# Patient Record
Sex: FEMALE | Race: WHITE | NOT HISPANIC OR LATINO | Employment: OTHER | URBAN - METROPOLITAN AREA
[De-identification: names, ages, dates, MRNs, and addresses within clinical notes are randomized per-mention and may not be internally consistent; named-entity substitution may affect disease eponyms.]

---

## 2017-05-24 ENCOUNTER — OFFICE VISIT (OUTPATIENT)
Dept: PULMONOLOGY | Facility: CLINIC | Age: 57
End: 2017-05-24
Payer: COMMERCIAL

## 2017-05-24 ENCOUNTER — HOSPITAL ENCOUNTER (OUTPATIENT)
Dept: RADIOLOGY | Facility: HOSPITAL | Age: 57
Discharge: HOME | End: 2017-05-24
Attending: NURSE PRACTITIONER
Payer: COMMERCIAL

## 2017-05-24 ENCOUNTER — TELEPHONE (OUTPATIENT)
Dept: PULMONOLOGY | Facility: CLINIC | Age: 57
End: 2017-05-24

## 2017-05-24 VITALS
RESPIRATION RATE: 20 BRPM | SYSTOLIC BLOOD PRESSURE: 100 MMHG | BODY MASS INDEX: 28.11 KG/M2 | WEIGHT: 152.75 LBS | HEIGHT: 62 IN | DIASTOLIC BLOOD PRESSURE: 64 MMHG | HEART RATE: 90 BPM | OXYGEN SATURATION: 97 %

## 2017-05-24 DIAGNOSIS — J45.20 MILD INTERMITTENT ASTHMA WITHOUT COMPLICATION: ICD-10-CM

## 2017-05-24 DIAGNOSIS — J45.20 MILD INTERMITTENT ASTHMA WITHOUT COMPLICATION: Primary | ICD-10-CM

## 2017-05-24 DIAGNOSIS — J45.51 SEVERE PERSISTENT ASTHMA WITH ACUTE EXACERBATION: Primary | ICD-10-CM

## 2017-05-24 PROCEDURE — 71020 XR CHEST PA AND LATERAL: CPT | Mod: TC,PO

## 2017-05-24 PROCEDURE — 99214 OFFICE O/P EST MOD 30 MIN: CPT | Mod: S$GLB,,, | Performed by: NURSE PRACTITIONER

## 2017-05-24 PROCEDURE — 99999 PR PBB SHADOW E&M-EST. PATIENT-LVL III: CPT | Mod: PBBFAC,,, | Performed by: NURSE PRACTITIONER

## 2017-05-24 PROCEDURE — 71020 XR CHEST PA AND LATERAL: CPT | Mod: 26,,, | Performed by: RADIOLOGY

## 2017-05-24 PROCEDURE — 1160F RVW MEDS BY RX/DR IN RCRD: CPT | Mod: S$GLB,,, | Performed by: NURSE PRACTITIONER

## 2017-05-24 RX ORDER — ESTRADIOL 0.03 MG/D
1 FILM, EXTENDED RELEASE TRANSDERMAL
COMMUNITY
End: 2021-12-06

## 2017-05-24 RX ORDER — ALBUTEROL SULFATE 90 UG/1
2 AEROSOL, METERED RESPIRATORY (INHALATION) EVERY 4 HOURS PRN
Qty: 18 G | Refills: 11 | Status: SHIPPED | OUTPATIENT
Start: 2017-05-24 | End: 2018-10-09 | Stop reason: SDUPTHER

## 2017-05-24 RX ORDER — LEVOTHYROXINE SODIUM 150 UG/1
150 TABLET ORAL
COMMUNITY
Start: 2017-01-24 | End: 2022-10-28

## 2017-05-24 RX ORDER — AZITHROMYCIN 250 MG/1
TABLET, FILM COATED ORAL
Qty: 6 TABLET | Refills: 0 | Status: SHIPPED | OUTPATIENT
Start: 2017-05-24 | End: 2021-12-23

## 2017-05-24 RX ORDER — PREDNISONE 20 MG/1
TABLET ORAL
Qty: 20 TABLET | Refills: 1 | Status: SHIPPED | OUTPATIENT
Start: 2017-05-24 | End: 2018-10-09 | Stop reason: SDUPTHER

## 2017-05-24 RX ORDER — BUDESONIDE AND FORMOTEROL FUMARATE DIHYDRATE 160; 4.5 UG/1; UG/1
2 AEROSOL RESPIRATORY (INHALATION) EVERY 12 HOURS
Qty: 1 INHALER | Refills: 12 | Status: SHIPPED | OUTPATIENT
Start: 2017-05-24 | End: 2018-10-09 | Stop reason: SDUPTHER

## 2017-05-24 NOTE — PROGRESS NOTES
Subjective:        Lisa Shore is a 57 y.o. female who has previously been evaluated here for asthma and presents for evaluation with complaints of acute asthma flare with increased cough. The patient is currently having symptoms / an exacerbation. Current symptoms include chest tightness, dyspnea, productive cough and wheezing, cough is occasionally productive of clear mucous. Symptoms have been present since 1 week ago and have been gradually worsening. She denies chest pain. Associated symptoms include fatigue, myalgias, poor exercise tolerance, shortness of breath twice in past week and wheezing.  This episode appears to have been triggered by dust, pollens, strong odors and upper respiratory infection. Treatments tried for the current exacerbation include had two days dose of symbicort then out of meds., which have provided some relief of symptoms. The patient has been having similar episodes for approximately 1 -2 times a year.     Current Disease Severity  The patient is having daytime symptoms throughout the day. The patient is having daytime symptoms more than once per week, but not nightly. The patient is using short-acting beta agonists for symptom control out of albuterol inhaler and symbicort. She has exacerbations requiring oral systemic corticosteroids 1 times per year. Current limitations in activity from asthma: some limitation today, prior to today, none. .  Number of urgent/emergent visit in last year: 0, none up until today.  The patient is not using a spacer with MDIs. Her best peak flow rate is not monitoring.  She is not monitoring peak flow rates at home.    The following portions of the patient's history were reviewed and updated as appropriate: allergies, current medications, past family history, past medical history, past social history, past surgical history and problem list.    Review of Systems  A comprehensive review of systems was negative except for: Constitutional:  "positive for fatigue and malaise  Ears, nose, mouth, throat, and face: positive for hoarseness, sore throat, voice change and post nasal drip  Respiratory: positive for cough and wheezing      Objective:      /64 (BP Location: Right arm, Patient Position: Sitting, BP Method: Manual)   Pulse 90   Resp 20   Ht 5' 2.4" (1.585 m)   Wt 69.3 kg (152 lb 12.5 oz)   SpO2 97%   BMI 27.59 kg/m²   General appearance: alert, appears stated age and cooperative  Head: Normocephalic, without obvious abnormality, atraumatic  Eyes: conjunctiva clear  Ears: normal TM's and external ear canals both ears  Nose: Nares normal. Septum midline. Mucosa normal. No drainage or sinus tenderness.  Throat: lips, mucosa, and tongue normal; teeth and gums normal  Lungs: clear to auscultation bilaterally  Heart: regular rate and rhythm, S1, S2 normal, no murmur, click, rub or gallop  Abdomen: soft, non-tender; bowel sounds normal; no masses,  no organomegaly  Extremities: extremities normal, atraumatic, no cyanosis or edema  Pulses: 2+ and symmetric  Skin: Skin color, texture, turgor normal. No rashes or lesions  Lymph nodes: Cervical, supraclavicular, and axillary nodes normal.    Diagnostic Review  Chest x-ray:   5/24/2017  The cardiac and mediastinal silhouettes are within normal limits.   The lungs are clear bilaterally.   Visualized osseous structures demonstrate no acute abnormality.     Assessment:      Severe persistent asthma. The patient is currently having an exacerbation. In general, the patient's disease is well controlled with mild intermittent asthma.        Plan:   Lisa Gage was seen today for cough and shortness of breath.    Diagnoses and all orders for this visit:    Severe persistent asthma with acute exacerbation  -     predniSONE (DELTASONE) 20 MG tablet; Prednisone 60 mg/ day for 3 days, 40 mg/day for 3 days,20 mg/ day for 3 days, (1/2 tablet )10 mg a day for 3 days.  -     azithromycin (Z-KOFFI) 250 MG tablet; " As per packet instructions  -     budesonide-formoterol 160-4.5 mcg (SYMBICORT) 160-4.5 mcg/actuation HFAA; Inhale 2 puffs into the lungs every 12 (twelve) hours. Wash out mouth after using  -     albuterol (VENTOLIN HFA) 90 mcg/actuation inhaler; Inhale 2 puffs into the lungs every 4 (four) hours as needed for Wheezing.  -     inhalation spacing device (BREATHERITE VALVED MDI CHAMBER); Use as directed for inhalation.  -     Spirometry with/without bronchodilator; Future       Review treatment goals of symptom prevention, minimizing limitation in activity, prevention of exacerbations and use of ER/inpatient care, maintenance of optimal pulmonary function and minimization of adverse effects of treatment.  Discussed distinction between quick-relief and controlled medications.  Discussed medication dosage, use, side effects, and goals of treatment in detail.    Warning signs of respiratory distress were reviewed with the patient.   Discussed avoidance of precipitants..      Return in about 4 weeks (around 6/21/2017) for asthma follow up, w/obed Ventura/louann pretty.

## 2017-06-14 ENCOUNTER — TELEPHONE (OUTPATIENT)
Dept: PULMONOLOGY | Facility: CLINIC | Age: 57
End: 2017-06-14

## 2018-10-08 ENCOUNTER — TELEPHONE (OUTPATIENT)
Dept: PULMONOLOGY | Facility: CLINIC | Age: 58
End: 2018-10-08

## 2018-10-08 NOTE — TELEPHONE ENCOUNTER
----- Message from Dominga Downing sent at 10/8/2018 11:46 AM CDT -----  Contact: pt  The pt request a return call, no additional info given, the pt can be reached at [phone]///thxMW

## 2018-10-09 ENCOUNTER — OFFICE VISIT (OUTPATIENT)
Dept: PULMONOLOGY | Facility: CLINIC | Age: 58
End: 2018-10-09
Payer: COMMERCIAL

## 2018-10-09 ENCOUNTER — HOSPITAL ENCOUNTER (OUTPATIENT)
Dept: RADIOLOGY | Facility: HOSPITAL | Age: 58
Discharge: HOME | End: 2018-10-09
Attending: NURSE PRACTITIONER
Payer: COMMERCIAL

## 2018-10-09 VITALS
RESPIRATION RATE: 17 BRPM | HEIGHT: 62 IN | DIASTOLIC BLOOD PRESSURE: 78 MMHG | OXYGEN SATURATION: 98 % | WEIGHT: 155.19 LBS | BODY MASS INDEX: 28.56 KG/M2 | SYSTOLIC BLOOD PRESSURE: 120 MMHG | HEART RATE: 72 BPM

## 2018-10-09 DIAGNOSIS — J45.20 MILD INTERMITTENT ASTHMA WITHOUT COMPLICATION: ICD-10-CM

## 2018-10-09 DIAGNOSIS — J45.21 MILD INTERMITTENT ASTHMA WITH (ACUTE) EXACERBATION: Primary | ICD-10-CM

## 2018-10-09 DIAGNOSIS — J32.0 RIGHT MAXILLARY SINUSITIS: ICD-10-CM

## 2018-10-09 DIAGNOSIS — J45.21 MILD INTERMITTENT ASTHMA WITH (ACUTE) EXACERBATION: ICD-10-CM

## 2018-10-09 PROCEDURE — 3008F BODY MASS INDEX DOCD: CPT | Mod: CPTII,S$GLB,, | Performed by: NURSE PRACTITIONER

## 2018-10-09 PROCEDURE — 71046 X-RAY EXAM CHEST 2 VIEWS: CPT | Mod: TC,FY,PO

## 2018-10-09 PROCEDURE — 71046 X-RAY EXAM CHEST 2 VIEWS: CPT | Mod: 26,,, | Performed by: RADIOLOGY

## 2018-10-09 PROCEDURE — 99214 OFFICE O/P EST MOD 30 MIN: CPT | Mod: S$GLB,,, | Performed by: NURSE PRACTITIONER

## 2018-10-09 PROCEDURE — 99999 PR PBB SHADOW E&M-EST. PATIENT-LVL IV: CPT | Mod: PBBFAC,,, | Performed by: NURSE PRACTITIONER

## 2018-10-09 RX ORDER — AMOXICILLIN AND CLAVULANATE POTASSIUM 875; 125 MG/1; MG/1
1 TABLET, FILM COATED ORAL EVERY 12 HOURS
Qty: 20 TABLET | Refills: 0 | Status: SHIPPED | OUTPATIENT
Start: 2018-10-09 | End: 2021-12-23 | Stop reason: SDUPTHER

## 2018-10-09 RX ORDER — PREDNISONE 20 MG/1
TABLET ORAL
Qty: 20 TABLET | Refills: 1 | Status: SHIPPED | OUTPATIENT
Start: 2018-10-09 | End: 2021-12-23

## 2018-10-09 RX ORDER — ALBUTEROL SULFATE 90 UG/1
2 AEROSOL, METERED RESPIRATORY (INHALATION) EVERY 4 HOURS PRN
Qty: 18 G | Refills: 11 | Status: SHIPPED | OUTPATIENT
Start: 2018-10-09 | End: 2019-10-09

## 2018-10-09 RX ORDER — BUDESONIDE AND FORMOTEROL FUMARATE DIHYDRATE 160; 4.5 UG/1; UG/1
2 AEROSOL RESPIRATORY (INHALATION) EVERY 12 HOURS
Qty: 1 INHALER | Refills: 12 | Status: SHIPPED | OUTPATIENT
Start: 2018-10-09 | End: 2021-12-23 | Stop reason: SDUPTHER

## 2018-10-09 NOTE — PROGRESS NOTES
Subjective:      Chief Complaint   Patient presents with    Asthma     acute flare       Lisa Shore is a 58 y.o. female who is followed in pulmonary by Dr. Guerrero for asthma, presents acute asthma flare with wheezing and cough.   The patient is currently having symptoms / exacerbation.   Current symptoms include chest tightness, dyspnea, productive cough and wheezing, cough is occasionally productive of clear mucous.   Symptoms have been present since 1 week ago and have been gradually worsening. She denies chest pain.   Associated symptoms include fatigue, myalgias, poor exercise tolerance, shortness of breath and wheezing.    This episode appears to have been triggered by pollens and upper respiratory infection.   Treatments tried for the current exacerbation include zyrtec and flonase.  Out of Symbicort and albuterol inhalers since March 2018. , which have provided some relief of symptoms. The patient has been having similar episodes for approximately 1 -2 times a year.     Current Disease Severity  The patient is having daytime symptoms throughout the day. The patient is having daytime symptoms more than once per week, but not nightly. The patient is using short-acting beta agonists for symptom control out of albuterol inhaler and symbicort. She has exacerbations requiring oral systemic corticosteroids 1 times per year. Current limitations in activity from asthma: some limitation today, prior to today, none.  Number of urgent/emergent visit in last year: 0, none up until today.  She had one URI in Feb, but did not trigger asthma.   The following portions of the patient's history were reviewed and updated as appropriate: allergies, current medications, past family history, past medical history, past social history, past surgical history and problem list.    Sinus Pain  Patient complains of facial pain, fevers, nasal congestion, post nasal drip, sinus pressure, sneezing and sore throat. Onset of  "symptoms was 10 days ago. Symptoms have been gradually worsening since that time. She is drinking plenty of fluids.  Past history is significant for asthma. Patient is some day social smoker about once - twice a month when out with friend.     Review of Systems  A comprehensive review of systems was negative except for: Constitutional: positive for chills, fatigue and malaise  Ears, nose, mouth, throat, and face: positive for nasal congestion, sore throat, voice change and post nasal drip and right sided maxillary sinus pain  Respiratory: positive for cough, sputum and wheezing      Objective:      /78   Pulse 72   Resp 17   Ht 5' 2.4" (1.585 m)   Wt 70.4 kg (155 lb 3.3 oz)   SpO2 98%   BMI 28.02 kg/m²   General appearance: alert, appears stated age and cooperative  Head: Normocephalic, without obvious abnormality, atraumatic  Eyes: conjunctiva clear  Ears: normal TM's and external ear canals both ears  Nose: Nares normal. Septum midline. Mucosa normal. No drainage or sinus tenderness.  Throat: abnormal findings: mild oropharyngeal erythema  Lungs: wheezes RUL, expiratory  Heart: regular rate and rhythm, S1, S2 normal, no murmur, click, rub or gallop  Abdomen: soft, non-tender; bowel sounds normal; no masses,  no organomegaly  Extremities: extremities normal, atraumatic, no cyanosis or edema  Pulses: 2+ and symmetric  Skin: Skin color, texture, turgor normal. No rashes or lesions  Lymph nodes: Cervical, supraclavicular, and axillary nodes normal.    Diagnostic Review  Chest x-ray:   10/9/2018  CLINICAL HISTORY:  Mild intermittent asthma with (acute) exacerbation  TECHNIQUE:  PA and lateral views of the chest were performed.  COMPARISON:  05/24/2017     FINDINGS:  Lungs are clear.  Cardiomediastinal silhouette and osseous structures are   stable in appearance with degenerative changes of the thoracic spine   noted.     IMPRESSION: No acute disease or interval change.    Electronically signed by:     Jamin " MD Mimi  Date:                                    10/09/2018    Assessment:        1. Mild intermittent asthma with (acute) exacerbation  albuterol (VENTOLIN HFA) 90 mcg/actuation inhaler    budesonide-formoterol 160-4.5 mcg (SYMBICORT) 160-4.5 mcg/actuation HFAA    X-Ray Chest PA And Lateral    predniSONE (DELTASONE) 20 MG tablet   2. Right maxillary sinusitis  amoxicillin-clavulanate 875-125mg (AUGMENTIN) 875-125 mg per tablet     Plan:   Lisa Gage was seen today for asthma.    Diagnoses and all orders for this visit:    Mild intermittent asthma with (acute) exacerbation  -     albuterol (VENTOLIN HFA) 90 mcg/actuation inhaler; Inhale 2 puffs into the lungs every 4 (four) hours as needed for Wheezing.  -     budesonide-formoterol 160-4.5 mcg (SYMBICORT) 160-4.5 mcg/actuation HFAA; Inhale 2 puffs into the lungs every 12 (twelve) hours. Wash out mouth after using  -     X-Ray Chest PA And Lateral; Future  -     predniSONE (DELTASONE) 20 MG tablet; Prednisone 60 mg/ day for 3 days, 40 mg/day for 3 days,20 mg/ day for 3 days, (1/2 tablet )10 mg a day for 3 days.    Right maxillary sinusitis  -     amoxicillin-clavulanate 875-125mg (AUGMENTIN) 875-125 mg per tablet; Take 1 tablet by mouth every 12 (twelve) hours.       Review treatment goals of symptom prevention, minimizing limitation in activity, prevention of exacerbations and use of ER/inpatient care, maintenance of optimal pulmonary function and minimization of adverse effects of treatment.  Discussed distinction between quick-relief and controlled medications.  Discussed medication dosage, use, side effects, and goals of treatment in detail.    Warning signs of respiratory distress were reviewed with the patient.   Discussed avoidance of precipitants..      Follow-up in about 3 months (around 1/9/2019) for Asthma w/review pretty.

## 2018-10-11 ENCOUNTER — TELEPHONE (OUTPATIENT)
Dept: PULMONOLOGY | Facility: CLINIC | Age: 58
End: 2018-10-11

## 2018-10-11 NOTE — TELEPHONE ENCOUNTER
----- Message from Kendy Frye LPN sent at 10/10/2018 11:18 AM CDT -----  Contact: Patient  Patient would like to know the results of her cxr. She states she was waiting to get the email, and  view it on her portal.  ----- Message -----  From: Lindsey Veliz  Sent: 10/10/2018  10:43 AM  To: Megan CORTEZ Staff    Patient requesting her test results, please call her back at [phone]. Thank you

## 2018-10-11 NOTE — TELEPHONE ENCOUNTER
Telephoned no answer left message of normal results from imaging. Advised reason she did not receive email, she is not active on portal and would need to activate her my [facility] account.   Advised to call back at [phone] with any questions.

## 2021-12-23 ENCOUNTER — TELEPHONE (OUTPATIENT)
Dept: PULMONOLOGY | Facility: CLINIC | Age: 61
End: 2021-12-23
Payer: COMMERCIAL

## 2021-12-23 ENCOUNTER — OFFICE VISIT (OUTPATIENT)
Dept: PULMONOLOGY | Facility: CLINIC | Age: 61
End: 2021-12-23
Payer: COMMERCIAL

## 2021-12-23 ENCOUNTER — HOSPITAL ENCOUNTER (OUTPATIENT)
Dept: RADIOLOGY | Facility: HOSPITAL | Age: 61
Discharge: HOME | End: 2021-12-23
Attending: NURSE PRACTITIONER
Payer: COMMERCIAL

## 2021-12-23 VITALS
WEIGHT: 156.75 LBS | DIASTOLIC BLOOD PRESSURE: 70 MMHG | HEIGHT: 62 IN | RESPIRATION RATE: 14 BRPM | BODY MASS INDEX: 28.84 KG/M2 | HEART RATE: 72 BPM | OXYGEN SATURATION: 97 % | SYSTOLIC BLOOD PRESSURE: 118 MMHG

## 2021-12-23 DIAGNOSIS — J45.21 MILD INTERMITTENT ASTHMA WITH (ACUTE) EXACERBATION: ICD-10-CM

## 2021-12-23 DIAGNOSIS — J45.909 ASTHMA, UNSPECIFIED ASTHMA SEVERITY, UNSPECIFIED WHETHER COMPLICATED, UNSPECIFIED WHETHER PERSISTENT: ICD-10-CM

## 2021-12-23 DIAGNOSIS — J20.9 ACUTE BRONCHITIS, UNSPECIFIED ORGANISM: ICD-10-CM

## 2021-12-23 DIAGNOSIS — J45.909 ASTHMA, UNSPECIFIED ASTHMA SEVERITY, UNSPECIFIED WHETHER COMPLICATED, UNSPECIFIED WHETHER PERSISTENT: Primary | ICD-10-CM

## 2021-12-23 DIAGNOSIS — J45.20 MILD INTERMITTENT ASTHMA WITHOUT COMPLICATION: Primary | ICD-10-CM

## 2021-12-23 PROCEDURE — 1160F RVW MEDS BY RX/DR IN RCRD: CPT | Mod: CPTII,S$GLB,, | Performed by: NURSE PRACTITIONER

## 2021-12-23 PROCEDURE — 3074F PR MOST RECENT SYSTOLIC BLOOD PRESSURE < 130 MM HG: ICD-10-PCS | Mod: CPTII,S$GLB,, | Performed by: NURSE PRACTITIONER

## 2021-12-23 PROCEDURE — 1159F MED LIST DOCD IN RCRD: CPT | Mod: CPTII,S$GLB,, | Performed by: NURSE PRACTITIONER

## 2021-12-23 PROCEDURE — 3078F PR MOST RECENT DIASTOLIC BLOOD PRESSURE < 80 MM HG: ICD-10-PCS | Mod: CPTII,S$GLB,, | Performed by: NURSE PRACTITIONER

## 2021-12-23 PROCEDURE — 1160F PR REVIEW ALL MEDS BY PRESCRIBER/CLIN PHARMACIST DOCUMENTED: ICD-10-PCS | Mod: CPTII,S$GLB,, | Performed by: NURSE PRACTITIONER

## 2021-12-23 PROCEDURE — 3078F DIAST BP <80 MM HG: CPT | Mod: CPTII,S$GLB,, | Performed by: NURSE PRACTITIONER

## 2021-12-23 PROCEDURE — 99999 PR PBB SHADOW E&M-EST. PATIENT-LVL IV: CPT | Mod: PBBFAC,,, | Performed by: NURSE PRACTITIONER

## 2021-12-23 PROCEDURE — 99203 OFFICE O/P NEW LOW 30 MIN: CPT | Mod: S$GLB,,, | Performed by: NURSE PRACTITIONER

## 2021-12-23 PROCEDURE — 99999 PR PBB SHADOW E&M-EST. PATIENT-LVL IV: ICD-10-PCS | Mod: PBBFAC,,, | Performed by: NURSE PRACTITIONER

## 2021-12-23 PROCEDURE — 71046 XR CHEST PA AND LATERAL: ICD-10-PCS | Mod: 26,,, | Performed by: RADIOLOGY

## 2021-12-23 PROCEDURE — 3074F SYST BP LT 130 MM HG: CPT | Mod: CPTII,S$GLB,, | Performed by: NURSE PRACTITIONER

## 2021-12-23 PROCEDURE — 71046 X-RAY EXAM CHEST 2 VIEWS: CPT | Mod: TC,FY,PO

## 2021-12-23 PROCEDURE — 99203 PR OFFICE/OUTPT VISIT, NEW, LEVL III, 30-44 MIN: ICD-10-PCS | Mod: S$GLB,,, | Performed by: NURSE PRACTITIONER

## 2021-12-23 PROCEDURE — 1159F PR MEDICATION LIST DOCUMENTED IN MEDICAL RECORD: ICD-10-PCS | Mod: CPTII,S$GLB,, | Performed by: NURSE PRACTITIONER

## 2021-12-23 PROCEDURE — 71046 X-RAY EXAM CHEST 2 VIEWS: CPT | Mod: 26,,, | Performed by: RADIOLOGY

## 2021-12-23 PROCEDURE — 3008F PR BODY MASS INDEX (BMI) DOCUMENTED: ICD-10-PCS | Mod: CPTII,S$GLB,, | Performed by: NURSE PRACTITIONER

## 2021-12-23 PROCEDURE — 3008F BODY MASS INDEX DOCD: CPT | Mod: CPTII,S$GLB,, | Performed by: NURSE PRACTITIONER

## 2021-12-23 RX ORDER — BUDESONIDE AND FORMOTEROL FUMARATE DIHYDRATE 160; 4.5 UG/1; UG/1
2 AEROSOL RESPIRATORY (INHALATION) EVERY 12 HOURS
Qty: 10.2 G | Refills: 11 | Status: SHIPPED | OUTPATIENT
Start: 2021-12-23 | End: 2022-10-28

## 2021-12-23 RX ORDER — ROSUVASTATIN CALCIUM 10 MG/1
1 TABLET, COATED ORAL NIGHTLY
COMMUNITY
Start: 2021-12-14

## 2021-12-23 RX ORDER — BENZONATATE 200 MG/1
200 CAPSULE ORAL 3 TIMES DAILY PRN
Qty: 45 CAPSULE | Refills: 2 | Status: SHIPPED | OUTPATIENT
Start: 2021-12-23 | End: 2022-01-02

## 2021-12-23 RX ORDER — SOD SULF/POT CHLORIDE/MAG SULF 1.479 G
TABLET ORAL
COMMUNITY
Start: 2021-12-22 | End: 2022-10-28

## 2021-12-23 RX ORDER — AMOXICILLIN AND CLAVULANATE POTASSIUM 875; 125 MG/1; MG/1
1 TABLET, FILM COATED ORAL EVERY 12 HOURS
Qty: 20 TABLET | Refills: 0 | Status: SHIPPED | OUTPATIENT
Start: 2021-12-23 | End: 2022-10-28

## 2021-12-23 RX ORDER — ALBUTEROL SULFATE 90 UG/1
2 AEROSOL, METERED RESPIRATORY (INHALATION) EVERY 4 HOURS PRN
Qty: 6.7 G | Refills: 11 | Status: SHIPPED | OUTPATIENT
Start: 2021-12-23 | End: 2022-10-28

## 2021-12-23 RX ORDER — PREDNISONE 20 MG/1
TABLET ORAL
Qty: 21 TABLET | Refills: 0 | Status: SHIPPED | OUTPATIENT
Start: 2021-12-23 | End: 2022-10-28

## 2022-01-12 ENCOUNTER — HOSPITAL ENCOUNTER (OUTPATIENT)
Dept: RADIOLOGY | Facility: HOSPITAL | Age: 62
Discharge: HOME | End: 2022-01-12
Attending: PHYSICIAN ASSISTANT
Payer: COMMERCIAL

## 2022-01-12 ENCOUNTER — OFFICE VISIT (OUTPATIENT)
Dept: PULMONOLOGY | Facility: CLINIC | Age: 62
End: 2022-01-12
Payer: COMMERCIAL

## 2022-01-12 ENCOUNTER — TELEPHONE (OUTPATIENT)
Dept: PULMONOLOGY | Facility: CLINIC | Age: 62
End: 2022-01-12
Payer: COMMERCIAL

## 2022-01-12 VITALS
HEART RATE: 106 BPM | OXYGEN SATURATION: 97 % | RESPIRATION RATE: 16 BRPM | SYSTOLIC BLOOD PRESSURE: 124 MMHG | HEIGHT: 62 IN | BODY MASS INDEX: 29.13 KG/M2 | WEIGHT: 158.31 LBS | DIASTOLIC BLOOD PRESSURE: 72 MMHG

## 2022-01-12 DIAGNOSIS — R06.02 SOB (SHORTNESS OF BREATH): ICD-10-CM

## 2022-01-12 DIAGNOSIS — R06.02 SOB (SHORTNESS OF BREATH): Primary | ICD-10-CM

## 2022-01-12 DIAGNOSIS — J06.9 UPPER RESPIRATORY TRACT INFECTION, UNSPECIFIED TYPE: ICD-10-CM

## 2022-01-12 DIAGNOSIS — J45.41 MODERATE PERSISTENT ASTHMA WITH ACUTE EXACERBATION: Primary | ICD-10-CM

## 2022-01-12 PROCEDURE — 99213 OFFICE O/P EST LOW 20 MIN: CPT | Mod: 25,S$GLB,, | Performed by: PHYSICIAN ASSISTANT

## 2022-01-12 PROCEDURE — 96372 THER/PROPH/DIAG INJ SC/IM: CPT | Mod: S$GLB,,, | Performed by: PHYSICIAN ASSISTANT

## 2022-01-12 PROCEDURE — 3074F PR MOST RECENT SYSTOLIC BLOOD PRESSURE < 130 MM HG: ICD-10-PCS | Mod: CPTII,S$GLB,, | Performed by: PHYSICIAN ASSISTANT

## 2022-01-12 PROCEDURE — 3008F PR BODY MASS INDEX (BMI) DOCUMENTED: ICD-10-PCS | Mod: CPTII,S$GLB,, | Performed by: PHYSICIAN ASSISTANT

## 2022-01-12 PROCEDURE — 96372 PR INJECTION,THERAP/PROPH/DIAG2ST, IM OR SUBCUT: ICD-10-PCS | Mod: S$GLB,,, | Performed by: PHYSICIAN ASSISTANT

## 2022-01-12 PROCEDURE — 71046 X-RAY EXAM CHEST 2 VIEWS: CPT | Mod: TC

## 2022-01-12 PROCEDURE — 71046 X-RAY EXAM CHEST 2 VIEWS: CPT | Mod: 26,,, | Performed by: RADIOLOGY

## 2022-01-12 PROCEDURE — 1159F MED LIST DOCD IN RCRD: CPT | Mod: CPTII,S$GLB,, | Performed by: PHYSICIAN ASSISTANT

## 2022-01-12 PROCEDURE — 1160F PR REVIEW ALL MEDS BY PRESCRIBER/CLIN PHARMACIST DOCUMENTED: ICD-10-PCS | Mod: CPTII,S$GLB,, | Performed by: PHYSICIAN ASSISTANT

## 2022-01-12 PROCEDURE — 1159F PR MEDICATION LIST DOCUMENTED IN MEDICAL RECORD: ICD-10-PCS | Mod: CPTII,S$GLB,, | Performed by: PHYSICIAN ASSISTANT

## 2022-01-12 PROCEDURE — 1160F RVW MEDS BY RX/DR IN RCRD: CPT | Mod: CPTII,S$GLB,, | Performed by: PHYSICIAN ASSISTANT

## 2022-01-12 PROCEDURE — 3078F PR MOST RECENT DIASTOLIC BLOOD PRESSURE < 80 MM HG: ICD-10-PCS | Mod: CPTII,S$GLB,, | Performed by: PHYSICIAN ASSISTANT

## 2022-01-12 PROCEDURE — 3074F SYST BP LT 130 MM HG: CPT | Mod: CPTII,S$GLB,, | Performed by: PHYSICIAN ASSISTANT

## 2022-01-12 PROCEDURE — 71046 XR CHEST PA AND LATERAL: ICD-10-PCS | Mod: 26,,, | Performed by: RADIOLOGY

## 2022-01-12 PROCEDURE — 99999 PR PBB SHADOW E&M-EST. PATIENT-LVL IV: ICD-10-PCS | Mod: PBBFAC,,, | Performed by: PHYSICIAN ASSISTANT

## 2022-01-12 PROCEDURE — 99999 PR PBB SHADOW E&M-EST. PATIENT-LVL IV: CPT | Mod: PBBFAC,,, | Performed by: PHYSICIAN ASSISTANT

## 2022-01-12 PROCEDURE — 3078F DIAST BP <80 MM HG: CPT | Mod: CPTII,S$GLB,, | Performed by: PHYSICIAN ASSISTANT

## 2022-01-12 PROCEDURE — 99213 PR OFFICE/OUTPT VISIT, EST, LEVL III, 20-29 MIN: ICD-10-PCS | Mod: 25,S$GLB,, | Performed by: PHYSICIAN ASSISTANT

## 2022-01-12 PROCEDURE — 3008F BODY MASS INDEX DOCD: CPT | Mod: CPTII,S$GLB,, | Performed by: PHYSICIAN ASSISTANT

## 2022-01-12 RX ORDER — DOXYCYCLINE 100 MG/1
100 CAPSULE ORAL EVERY 12 HOURS
Qty: 20 CAPSULE | Refills: 1 | Status: SHIPPED | OUTPATIENT
Start: 2022-01-12 | End: 2022-10-28

## 2022-01-12 RX ORDER — METHYLPREDNISOLONE 4 MG/1
TABLET ORAL
Qty: 1 EACH | Refills: 0 | Status: SHIPPED | OUTPATIENT
Start: 2022-01-12 | End: 2022-10-28

## 2022-01-12 RX ORDER — BENZONATATE 200 MG/1
200 CAPSULE ORAL 3 TIMES DAILY PRN
Qty: 90 CAPSULE | Refills: 0 | Status: SHIPPED | OUTPATIENT
Start: 2022-01-12 | End: 2022-01-22

## 2022-01-12 RX ORDER — TRIAMCINOLONE ACETONIDE 40 MG/ML
40 INJECTION, SUSPENSION INTRA-ARTICULAR; INTRAMUSCULAR
Status: COMPLETED | OUTPATIENT
Start: 2022-01-12 | End: 2022-01-12

## 2022-01-12 RX ADMIN — TRIAMCINOLONE ACETONIDE 40 MG: 40 INJECTION, SUSPENSION INTRA-ARTICULAR; INTRAMUSCULAR at 11:01

## 2022-01-12 NOTE — TELEPHONE ENCOUNTER
----- Message from Nia Zelaya PA-C sent at 1/12/2022  2:54 PM CST -----  Please call patient and let her know normal chest X-ray.

## 2022-01-12 NOTE — PROGRESS NOTES
Subjective:       Patient ID: Lisa Shore is a 61 y.o. female.    Chief Complaint: Asthma, Cough, and Pneumonia      62yo female with history of asthma on symbicort presents for complaint of cough, wheezing, clear sputum production, and STEVENSON; these symptoms started about 4 weeks ago, she came into pulmonary clinic and received steroid pack and augmentin, she says at this time she also had sinus congestion which has since resolved but says the cough and STEVENSON has not gone away; taking otc cold medications intermittently. No fever, no hemoptysis. Some chest discomfort with cough. She does not appear toxic and is in no acute distress at this time.       There is no immunization history on file for this patient.   Tobacco Use: High Risk    Smoking Tobacco Use: Light Tobacco Smoker    Smokeless Tobacco Use: Never Used      Past Medical History:   Diagnosis Date    Asthma     Thyroid disease       Current Outpatient Medications on File Prior to Visit   Medication Sig Dispense Refill    albuterol (PROVENTIL/VENTOLIN HFA) 90 mcg/actuation inhaler Inhale 2 puffs into the lungs every 4 (four) hours as needed for Wheezing. Rescue 6.7 g 11    amoxicillin-clavulanate 875-125mg (AUGMENTIN) 875-125 mg per tablet Take 1 tablet by mouth every 12 (twelve) hours. 20 tablet 0    budesonide-formoterol 160-4.5 mcg (SYMBICORT) 160-4.5 mcg/actuation HFAA Inhale 2 puffs into the lungs every 12 (twelve) hours. Wash out mouth after using 10.2 g 11    estradioL (LYLLANA) 0.1 mg/24 hr PTSW APPLY 1 PATCH TOPICALLY TO THE SKIN 2 TIMES A WEEK 25 patch 0    inhalation spacing device (BREATHERITE VALVED MDI CHAMBER) Use as directed for inhalation. 1 Device prn    levothyroxine (SYNTHROID) 150 MCG tablet Take 150 mcg by mouth.      levothyroxine (SYNTHROID, LEVOTHROID) 175 MCG tablet Take 175 mcg by mouth once daily.      loratadine (CLARITIN) 10 mg tablet Take 1 tablet (10 mg total) by mouth once daily. 30 tablet 5    predniSONE  "(DELTASONE) 20 MG tablet 3 tablets for 3 days. 2 tablets for 3 days. 1 tablet for 3 days. One half tablet for 3 days. 21 tablet 0    rosuvastatin (CRESTOR) 10 MG tablet Take 1 tablet by mouth every evening.      sod sulf-pot chloride-mag sulf (SUTAB) 1.479-0.188- 0.225 gram tablet Use as split dose as directed by physician.      zolpidem (AMBIEN) 5 MG Tab Take 1 tablet (5 mg total) by mouth nightly as needed (for insomnia). (Patient not taking: Reported on 12/23/2021) 10 tablet 0     No current facility-administered medications on file prior to visit.        Review of Systems   Constitutional: Negative for fever, weight loss, appetite change, fatigue and weakness.   HENT: Positive for postnasal drip. Negative for rhinorrhea, sinus pressure, trouble swallowing and congestion.    Respiratory: Positive for cough, sputum production, wheezing and dyspnea on extertion. Negative for choking, chest tightness and shortness of breath.    Cardiovascular: Negative for chest pain and leg swelling.   Musculoskeletal: Negative for arthralgias, gait problem and joint swelling.   Gastrointestinal: Negative for nausea, vomiting and abdominal pain.   Neurological: Negative for dizziness, weakness and headaches.   All other systems reviewed and are negative.      Objective:       Vitals:    01/12/22 1119   BP: 124/72   Pulse: 106   Resp: 16   SpO2: 97%   Weight: 71.8 kg (158 lb 4.6 oz)   Height: 5' 2" (1.575 m)       Physical Exam   Constitutional: She is oriented to person, place, and time. She appears well-developed and well-nourished. No distress.   HENT:   Head: Normocephalic.   Nose: Nose normal.   Mouth/Throat: Oropharynx is clear and moist.   Cardiovascular: Normal rate and regular rhythm.   Pulmonary/Chest: Effort normal and breath sounds normal. No respiratory distress. She has no wheezes. She has no rhonchi. She has no rales.   Musculoskeletal:         General: No edema.      Cervical back: Normal range of motion and neck " supple.   Lymphadenopathy: No supraclavicular adenopathy is present.     She has no cervical adenopathy.   Neurological: She is alert and oriented to person, place, and time. Gait normal.   Skin: Skin is warm and dry.   Psychiatric: She has a normal mood and affect.   Vitals reviewed.    Personal Diagnostic Review    X-Ray Chest PA And Lateral  Narrative: EXAMINATION:  XR CHEST PA AND LATERAL    CLINICAL HISTORY:  Shortness of breath    TECHNIQUE:  PA and lateral views of the chest were performed.    COMPARISON:  Chest radiograph December 23, 2021    FINDINGS:  Lungs are clear without focal consolidation, edema, or mass.  There is no pneumothorax or pleural effusion.  Cardiomediastinal silhouette is within normal limits.  No acute osseous abnormality.  Impression: As above.    Electronically signed by: Chicho Hair  Date:    01/12/2022  Time:    14:02        Assessment/Plan:       Problem List Items Addressed This Visit        ENT    Upper respiratory tract infection     Doxycycline, medrol dose pack, kenalog  Return in 2 weeks to ensure improvement         Relevant Medications    triamcinolone acetonide injection 40 mg (Completed)    methylPREDNISolone (MEDROL DOSEPACK) 4 mg tablet    doxycycline (MONODOX) 100 MG capsule    benzonatate (TESSALON) 200 MG capsule       Pulmonary    Asthma - Primary     No wheezing on exam  Continue daily medications, see URI         Relevant Medications    triamcinolone acetonide injection 40 mg (Completed)    methylPREDNISolone (MEDROL DOSEPACK) 4 mg tablet    doxycycline (MONODOX) 100 MG capsule    benzonatate (TESSALON) 200 MG capsule        Discussed diagnosis, its evaluation, treatment and usual course. All questions answered.    Patient verbalized understanding of plan and left in no acute distress    Thank you for the courtesy of participating in the care of this patient    Nia Zelaya PA-C

## 2022-01-13 PROBLEM — J06.9 UPPER RESPIRATORY TRACT INFECTION: Status: ACTIVE | Noted: 2022-01-13

## 2023-11-29 DIAGNOSIS — J20.9 ACUTE BRONCHITIS, UNSPECIFIED ORGANISM: Primary | ICD-10-CM

## 2023-11-29 DIAGNOSIS — J45.909 ASTHMA, UNSPECIFIED ASTHMA SEVERITY, UNSPECIFIED WHETHER COMPLICATED, UNSPECIFIED WHETHER PERSISTENT: Primary | ICD-10-CM

## 2023-11-29 RX ORDER — ALBUTEROL SULFATE 90 UG/1
2 AEROSOL, METERED RESPIRATORY (INHALATION) EVERY 4 HOURS PRN
Qty: 8.7 G | Refills: 1 | Status: SHIPPED | OUTPATIENT
Start: 2023-11-29

## 2023-11-29 RX ORDER — PROMETHAZINE HYDROCHLORIDE AND DEXTROMETHORPHAN HYDROBROMIDE 6.25; 15 MG/5ML; MG/5ML
5 SYRUP ORAL EVERY 6 HOURS PRN
Qty: 180 ML | Refills: 1 | Status: SHIPPED | OUTPATIENT
Start: 2023-11-29

## 2023-11-29 RX ORDER — FLUTICASONE PROPIONATE 50 MCG
2 SPRAY, SUSPENSION (ML) NASAL DAILY
Qty: 16 G | Refills: 11 | Status: SHIPPED | OUTPATIENT
Start: 2023-11-29

## 2023-11-29 RX ORDER — BUDESONIDE AND FORMOTEROL FUMARATE DIHYDRATE 160; 4.5 UG/1; UG/1
2 AEROSOL RESPIRATORY (INHALATION) EVERY 12 HOURS
Qty: 10.2 G | Refills: 3 | Status: SHIPPED | OUTPATIENT
Start: 2023-11-29 | End: 2024-11-28

## 2023-11-29 RX ORDER — PREDNISONE 20 MG/1
TABLET ORAL
Qty: 21 TABLET | Refills: 0 | Status: SHIPPED | OUTPATIENT
Start: 2023-11-29 | End: 2023-12-23 | Stop reason: SDUPTHER

## 2023-11-30 ENCOUNTER — OFFICE VISIT (OUTPATIENT)
Dept: PULMONOLOGY | Facility: CLINIC | Age: 63
End: 2023-11-30
Payer: COMMERCIAL

## 2023-11-30 ENCOUNTER — HOSPITAL ENCOUNTER (OUTPATIENT)
Dept: RADIOLOGY | Facility: HOSPITAL | Age: 63
Discharge: HOME | End: 2023-11-30
Attending: NURSE PRACTITIONER
Payer: COMMERCIAL

## 2023-11-30 VITALS
SYSTOLIC BLOOD PRESSURE: 110 MMHG | WEIGHT: 142.31 LBS | DIASTOLIC BLOOD PRESSURE: 62 MMHG | HEIGHT: 62 IN | OXYGEN SATURATION: 98 % | RESPIRATION RATE: 16 BRPM | HEART RATE: 77 BPM | BODY MASS INDEX: 26.19 KG/M2

## 2023-11-30 DIAGNOSIS — E66.3 OVERWEIGHT WITH BODY MASS INDEX (BMI) 25.0-29.9: ICD-10-CM

## 2023-11-30 DIAGNOSIS — J45.20 MILD INTERMITTENT ASTHMA WITHOUT COMPLICATION: Primary | ICD-10-CM

## 2023-11-30 DIAGNOSIS — J06.9 UPPER RESPIRATORY TRACT INFECTION, UNSPECIFIED TYPE: ICD-10-CM

## 2023-11-30 DIAGNOSIS — J45.909 ASTHMA, UNSPECIFIED ASTHMA SEVERITY, UNSPECIFIED WHETHER COMPLICATED, UNSPECIFIED WHETHER PERSISTENT: ICD-10-CM

## 2023-11-30 PROCEDURE — 1159F MED LIST DOCD IN RCRD: CPT | Mod: CPTII,S$GLB,, | Performed by: NURSE PRACTITIONER

## 2023-11-30 PROCEDURE — 3008F PR BODY MASS INDEX (BMI) DOCUMENTED: ICD-10-PCS | Mod: CPTII,S$GLB,, | Performed by: NURSE PRACTITIONER

## 2023-11-30 PROCEDURE — 1160F PR REVIEW ALL MEDS BY PRESCRIBER/CLIN PHARMACIST DOCUMENTED: ICD-10-PCS | Mod: CPTII,S$GLB,, | Performed by: NURSE PRACTITIONER

## 2023-11-30 PROCEDURE — 1159F PR MEDICATION LIST DOCUMENTED IN MEDICAL RECORD: ICD-10-PCS | Mod: CPTII,S$GLB,, | Performed by: NURSE PRACTITIONER

## 2023-11-30 PROCEDURE — 99214 PR OFFICE/OUTPT VISIT, EST, LEVL IV, 30-39 MIN: ICD-10-PCS | Mod: S$GLB,,, | Performed by: NURSE PRACTITIONER

## 2023-11-30 PROCEDURE — 3078F PR MOST RECENT DIASTOLIC BLOOD PRESSURE < 80 MM HG: ICD-10-PCS | Mod: CPTII,S$GLB,, | Performed by: NURSE PRACTITIONER

## 2023-11-30 PROCEDURE — 71046 X-RAY EXAM CHEST 2 VIEWS: CPT | Mod: 26,,, | Performed by: RADIOLOGY

## 2023-11-30 PROCEDURE — 1160F RVW MEDS BY RX/DR IN RCRD: CPT | Mod: CPTII,S$GLB,, | Performed by: NURSE PRACTITIONER

## 2023-11-30 PROCEDURE — 99999 PR PBB SHADOW E&M-EST. PATIENT-LVL IV: CPT | Mod: PBBFAC,,, | Performed by: NURSE PRACTITIONER

## 2023-11-30 PROCEDURE — 99999 PR PBB SHADOW E&M-EST. PATIENT-LVL IV: ICD-10-PCS | Mod: PBBFAC,,, | Performed by: NURSE PRACTITIONER

## 2023-11-30 PROCEDURE — 3008F BODY MASS INDEX DOCD: CPT | Mod: CPTII,S$GLB,, | Performed by: NURSE PRACTITIONER

## 2023-11-30 PROCEDURE — 3074F PR MOST RECENT SYSTOLIC BLOOD PRESSURE < 130 MM HG: ICD-10-PCS | Mod: CPTII,S$GLB,, | Performed by: NURSE PRACTITIONER

## 2023-11-30 PROCEDURE — 99214 OFFICE O/P EST MOD 30 MIN: CPT | Mod: S$GLB,,, | Performed by: NURSE PRACTITIONER

## 2023-11-30 PROCEDURE — 3078F DIAST BP <80 MM HG: CPT | Mod: CPTII,S$GLB,, | Performed by: NURSE PRACTITIONER

## 2023-11-30 PROCEDURE — 71046 X-RAY EXAM CHEST 2 VIEWS: CPT | Mod: TC

## 2023-11-30 PROCEDURE — 71046 XR CHEST PA AND LATERAL: ICD-10-PCS | Mod: 26,,, | Performed by: RADIOLOGY

## 2023-11-30 PROCEDURE — 3074F SYST BP LT 130 MM HG: CPT | Mod: CPTII,S$GLB,, | Performed by: NURSE PRACTITIONER

## 2023-11-30 RX ORDER — EZETIMIBE 10 MG/1
10 TABLET ORAL EVERY MORNING
COMMUNITY
Start: 2023-08-15

## 2023-11-30 NOTE — ASSESSMENT & PLAN NOTE
After exposed to influenza in grand children, became symptomatic 3 days after exposure.   Did not test, does not want testing today  Treatment  for asthma flare

## 2023-11-30 NOTE — PROGRESS NOTES
"Subjective:      Chief Complaint   Patient presents with    Asthma    acute URI     Exposed to influenza, developed influenza symptoms        Lisa Shore is a 63 y.o. female presents acute URI or influenza, grand child had influenza Thanksgiving then 3 days later patient had fever, chills, body aches, nausea/vomiting and cough with mucous and wheezing.. Today is day 5 from onset of symptoms. Patient states she called Liz LeJeune NP yesterday and prednisone taper, symbicort 160 mcg, flonase, Albuterol inhaler. Promethazine DM.   She decided to keep today's appointment for chest xray to make sure no pneumonia.     11/30/2023 chest xray lungs are clear.     The following portions of the patient's history were reviewed and updated as appropriate: allergies, current medications, past family history, past medical history, past social history, past surgical history and problem list.    Review of Systems  A comprehensive review of systems was negative except for: Constitutional: positive for chills, fatigue and malaise  Ears, nose, mouth, throat, and face: positive for nasal congestion, sore throat, voice change and post nasal drip and right sided maxillary sinus pain  Respiratory: positive for cough, sputum and wheezing      Objective:      /62   Pulse 77   Resp 16   Ht 5' 2" (1.575 m)   Wt 64.5 kg (142 lb 4.9 oz)   SpO2 98%   BMI 26.03 kg/m²   General appearance: alert, appears stated age and cooperative  Head: Normocephalic, without obvious abnormality, atraumatic  Eyes:  conjunctiva clear  Ears: normal TM's and external ear canals both ears  Nose: Nares normal. Septum midline. Mucosa normal. No drainage or sinus tenderness.  Throat: abnormal findings: mild oropharyngeal erythema  Lungs: wheezes RUL, expiratory  Heart: regular rate and rhythm, S1, S2 normal, no murmur, click, rub or gallop  Abdomen: soft, non-tender; bowel sounds normal; no masses,  no organomegaly  Extremities: extremities normal, " atraumatic, no cyanosis or edema  Pulses: 2+ and symmetric  Skin: Skin color, texture, turgor normal. No rashes or lesions  Lymph nodes: Cervical, supraclavicular, and axillary nodes normal.    Diagnostic Review    X-Ray Chest PA And Lateral  Narrative: EXAM:  XR CHEST PA AND LATERAL    CLINICAL HISTORY: [J45.909]-Unspecified asthma, uncomplicated.    COMPARISON STUDIES: 01/12/2022    TECHNIQUE:  Two-view chest x-ray    FINDINGS:  The heart size is normal.  The mediastinal silhouette is within normal limits.    The lungs are clear. No pleural effusion.    No acute osseous findings.  Age appropriate or no arthritic change.  Impression: No acute findings.    Finalized on: 11/30/2023 10:44 AM By:  Iván Sarah MD  [facility]# [id number]      2023-11-30 10:46:23.427    [facility]      Assessment:        1. Mild intermittent asthma without complication        2. Upper respiratory tract infection, unspecified type        3. Overweight with body mass index (BMI) 25.0-29.9             Plan:       Problem List Items Addressed This Visit       Upper respiratory tract infection     After exposed to influenza in grand children, became symptomatic 3 days after exposure.   Did not test, does not want testing today  Treatment  for asthma flare          Overweight with body mass index (BMI) 25.0-29.9     Encouraged calorie reduction and 30 minutes of exercise daily. Discussed impact of obesity on general health.  Regular exercise. Follow heart healthy low fat diet. regular exercise.   Wt Readings from Last 9 Encounters:   11/30/23 64.5 kg (142 lb 4.9 oz)   10/28/22 67.1 kg (148 lb)   01/12/22 71.8 kg (158 lb 4.6 oz)   12/23/21 71.1 kg (156 lb 12 oz)   10/09/18 70.4 kg (155 lb 3.3 oz)   05/24/17 69.3 kg (152 lb 12.5 oz)   01/08/16 71.7 kg (158 lb 1.1 oz)   08/27/14 70.9 kg (156 lb 3.2 oz)   02/27/12 62.3 kg (137 lb 4.5 oz)   Body mass index is 26.03 kg/m².           Asthma - Primary     Acute flare post URI, expose to influenza in grand  child  Patient never tested her self for influenza and not interest testing today  Continue medication as prescribed by Liz LeJeune NP yesterday  Prednisone taper, Symbicort 160 mcg, flonase, Albuterol inhaler. Promethazine DM.   Pt reports she is feeling improved with medication began yesterday.   Follow up reports she has seasonal asthma and does not feel she wants routine follow up for pretyt/feno/reevaluation  She request follow up as needed.                 Follow up if symptoms worsen or fail to improve per pt request for as needed fu on asthma.

## 2023-11-30 NOTE — ASSESSMENT & PLAN NOTE
Encouraged calorie reduction and 30 minutes of exercise daily. Discussed impact of obesity on general health.  Regular exercise. Follow heart healthy low fat diet. regular exercise.   Wt Readings from Last 9 Encounters:   11/30/23 64.5 kg (142 lb 4.9 oz)   10/28/22 67.1 kg (148 lb)   01/12/22 71.8 kg (158 lb 4.6 oz)   12/23/21 71.1 kg (156 lb 12 oz)   10/09/18 70.4 kg (155 lb 3.3 oz)   05/24/17 69.3 kg (152 lb 12.5 oz)   01/08/16 71.7 kg (158 lb 1.1 oz)   08/27/14 70.9 kg (156 lb 3.2 oz)   02/27/12 62.3 kg (137 lb 4.5 oz)   Body mass index is 26.03 kg/m².

## 2023-11-30 NOTE — Clinical Note
Dmitry, she came in today, chest xray lungs clear. Suspect she had influenza from grandchildren, she did want testing today.

## 2023-11-30 NOTE — ASSESSMENT & PLAN NOTE
Acute flare post URI, expose to influenza in grand child  Patient never tested her self for influenza and not interest testing today  Continue medication as prescribed by Liz LeJeune NP yesterday  Prednisone taper, Symbicort 160 mcg, flonase, Albuterol inhaler. Promethazine DM.   Pt reports she is feeling improved with medication began yesterday.   Follow up reports she has seasonal asthma and does not feel she wants routine follow up for pretty/feno/reevaluation  She request follow up as needed.

## 2023-12-23 DIAGNOSIS — J20.9 ACUTE BRONCHITIS, UNSPECIFIED ORGANISM: Primary | ICD-10-CM

## 2023-12-23 RX ORDER — PREDNISONE 20 MG/1
TABLET ORAL
Qty: 21 TABLET | Refills: 0 | Status: SHIPPED | OUTPATIENT
Start: 2023-12-23

## 2023-12-28 ENCOUNTER — OFFICE VISIT (OUTPATIENT)
Dept: PULMONOLOGY | Facility: CLINIC | Age: 63
End: 2023-12-28
Payer: COMMERCIAL

## 2023-12-28 VITALS
SYSTOLIC BLOOD PRESSURE: 124 MMHG | HEART RATE: 86 BPM | HEIGHT: 62 IN | OXYGEN SATURATION: 96 % | DIASTOLIC BLOOD PRESSURE: 76 MMHG | BODY MASS INDEX: 26.33 KG/M2 | WEIGHT: 143.06 LBS | RESPIRATION RATE: 16 BRPM

## 2023-12-28 DIAGNOSIS — J45.41 MODERATE PERSISTENT ASTHMA WITH ACUTE EXACERBATION: Primary | ICD-10-CM

## 2023-12-28 PROCEDURE — 1159F PR MEDICATION LIST DOCUMENTED IN MEDICAL RECORD: ICD-10-PCS | Mod: CPTII,S$GLB,, | Performed by: INTERNAL MEDICINE

## 2023-12-28 PROCEDURE — 3078F PR MOST RECENT DIASTOLIC BLOOD PRESSURE < 80 MM HG: ICD-10-PCS | Mod: CPTII,S$GLB,, | Performed by: INTERNAL MEDICINE

## 2023-12-28 PROCEDURE — 99999 PR PBB SHADOW E&M-EST. PATIENT-LVL III: CPT | Mod: PBBFAC,,, | Performed by: INTERNAL MEDICINE

## 2023-12-28 PROCEDURE — 1160F PR REVIEW ALL MEDS BY PRESCRIBER/CLIN PHARMACIST DOCUMENTED: ICD-10-PCS | Mod: CPTII,S$GLB,, | Performed by: INTERNAL MEDICINE

## 2023-12-28 PROCEDURE — 1160F RVW MEDS BY RX/DR IN RCRD: CPT | Mod: CPTII,S$GLB,, | Performed by: INTERNAL MEDICINE

## 2023-12-28 PROCEDURE — 3008F BODY MASS INDEX DOCD: CPT | Mod: CPTII,S$GLB,, | Performed by: INTERNAL MEDICINE

## 2023-12-28 PROCEDURE — 99213 PR OFFICE/OUTPT VISIT, EST, LEVL III, 20-29 MIN: ICD-10-PCS | Mod: S$GLB,,, | Performed by: INTERNAL MEDICINE

## 2023-12-28 PROCEDURE — 3074F PR MOST RECENT SYSTOLIC BLOOD PRESSURE < 130 MM HG: ICD-10-PCS | Mod: CPTII,S$GLB,, | Performed by: INTERNAL MEDICINE

## 2023-12-28 PROCEDURE — 1159F MED LIST DOCD IN RCRD: CPT | Mod: CPTII,S$GLB,, | Performed by: INTERNAL MEDICINE

## 2023-12-28 PROCEDURE — 3008F PR BODY MASS INDEX (BMI) DOCUMENTED: ICD-10-PCS | Mod: CPTII,S$GLB,, | Performed by: INTERNAL MEDICINE

## 2023-12-28 PROCEDURE — 3074F SYST BP LT 130 MM HG: CPT | Mod: CPTII,S$GLB,, | Performed by: INTERNAL MEDICINE

## 2023-12-28 PROCEDURE — 3078F DIAST BP <80 MM HG: CPT | Mod: CPTII,S$GLB,, | Performed by: INTERNAL MEDICINE

## 2023-12-28 PROCEDURE — 99213 OFFICE O/P EST LOW 20 MIN: CPT | Mod: S$GLB,,, | Performed by: INTERNAL MEDICINE

## 2023-12-28 PROCEDURE — 99999 PR PBB SHADOW E&M-EST. PATIENT-LVL III: ICD-10-PCS | Mod: PBBFAC,,, | Performed by: INTERNAL MEDICINE

## 2023-12-28 RX ORDER — PANTOPRAZOLE SODIUM 40 MG/1
1 TABLET, DELAYED RELEASE ORAL EVERY MORNING
COMMUNITY
Start: 2023-02-06 | End: 2024-02-06

## 2023-12-28 NOTE — PROGRESS NOTES
"Subjective:      Patient ID: Lisa Shore is a 63 y.o. female.    Chief Complaint: Shortness of Breath and Wheezing    Shortness of Breath  Associated symptoms include wheezing.   Wheezing   Associated symptoms include shortness of breath.       See 3-year-old female with seasonal allergies and asthma who was seen a few weeks ago by our nurse practitioner for acute exacerbation likely due to influenza.  Was prescribed steroids and antibiotics and continued with her current inhaled medications.  She improved somewhat but did require additional steroids since then.  Here today for re-evaluation.  Continuing to have some wheezing and productive cough at night especially.  Once and a what else she can take to improve with her congestion and cough.  Otherwise no complaints of fever, chills, purulent sputum, night sweats, chest pain or hemoptysis.  Previous visit she had a chest radiograph which was normal.    Review of Systems   Respiratory:  Positive for shortness of breath and wheezing.     as per hPI otherwise negative    Objective:     Physical Exam   Constitutional: She is oriented to person, place, and time. She appears well-developed. No distress.   HENT:   Head: Normocephalic.   Cardiovascular: Normal rate and regular rhythm.   Pulmonary/Chest: Normal expansion and symmetric chest wall expansion.   Low pitched wheezes with scattered ronchi   Musculoskeletal:         General: No edema.      Cervical back: Neck supple.   Neurological: She is alert and oriented to person, place, and time.   Psychiatric: She has a normal mood and affect.   Nursing note and vitals reviewed.          12/28/2023    10:49 AM 11/30/2023    10:37 AM 10/28/2022    10:32 AM 1/12/2022    11:19 AM 12/23/2021    12:37 PM 10/9/2018     1:04 PM 5/24/2017     3:13 PM   Pulmonary Function Tests   SpO2 96 % 98 %  97 % 97 % 98 % 97 %   Height 5' 2" (1.575 m) 5' 2" (1.575 m) 5' 2" (1.575 m) 5' 2" (1.575 m) 5' 2" (1.575 m) 5' 2.4" (1.585 m) 5' 2.4" " (1.585 m)   Weight 64.9 kg (143 lb 1.3 oz) 64.5 kg (142 lb 4.9 oz) 67.1 kg (148 lb) 71.8 kg (158 lb 4.6 oz) 71.1 kg (156 lb 12 oz) 70.4 kg (155 lb 3.3 oz) 69.3 kg (152 lb 12.5 oz)   BMI (Calculated) 26.2 26 27.1 28.9 28.7 28.1 27.6        Assessment:     1. Moderate persistent asthma with acute exacerbation           Plan:     Current exacerbation mostly improving but still with  Some persistent symptoms  Suggested adding Mucinex 600 b.i.d. with possible increased to 1200 b.i.d. to treat current symptoms  Patient will  some at the pharmacy start this today  Otherwise continue current medications as prescribed  We will just need some more time for current exacerbation to resolve  No worrisome signs or symptoms to suggest pneumonia ongoing infection  Return to clinic p.r.n.

## 2024-12-19 DIAGNOSIS — J20.9 ACUTE BRONCHITIS, UNSPECIFIED ORGANISM: ICD-10-CM

## 2024-12-20 RX ORDER — BUDESONIDE AND FORMOTEROL FUMARATE DIHYDRATE 160; 4.5 UG/1; UG/1
2 AEROSOL RESPIRATORY (INHALATION) EVERY 12 HOURS
Qty: 10.2 G | Refills: 11 | Status: SHIPPED | OUTPATIENT
Start: 2024-12-20 | End: 2025-12-20

## 2024-12-20 RX ORDER — ALBUTEROL SULFATE 90 UG/1
2 INHALANT RESPIRATORY (INHALATION) EVERY 4 HOURS PRN
Qty: 8.7 G | Refills: 11 | Status: SHIPPED | OUTPATIENT
Start: 2024-12-20